# Patient Record
Sex: MALE | Race: WHITE | Employment: STUDENT | URBAN - METROPOLITAN AREA
[De-identification: names, ages, dates, MRNs, and addresses within clinical notes are randomized per-mention and may not be internally consistent; named-entity substitution may affect disease eponyms.]

---

## 2017-03-21 ENCOUNTER — OFFICE VISIT (OUTPATIENT)
Dept: FAMILY MEDICINE CLINIC | Facility: CLINIC | Age: 22
End: 2017-03-21

## 2017-03-21 VITALS
SYSTOLIC BLOOD PRESSURE: 108 MMHG | HEART RATE: 64 BPM | WEIGHT: 154 LBS | HEIGHT: 72.5 IN | TEMPERATURE: 98 F | DIASTOLIC BLOOD PRESSURE: 62 MMHG | BODY MASS INDEX: 20.63 KG/M2 | RESPIRATION RATE: 18 BRPM

## 2017-03-21 DIAGNOSIS — Z00.00 ROUTINE PHYSICAL EXAMINATION: ICD-10-CM

## 2017-03-21 DIAGNOSIS — Z13.89 SCREENING FOR GENITOURINARY CONDITION: Primary | ICD-10-CM

## 2017-03-21 DIAGNOSIS — R31.9 HEMATURIA: ICD-10-CM

## 2017-03-21 LAB
APPEARANCE: CLEAR
BILIRUB UR QL STRIP.AUTO: NEGATIVE
CLARITY UR REFRACT.AUTO: CLEAR
GLUCOSE UR STRIP.AUTO-MCNC: NEGATIVE MG/DL
KETONES UR STRIP.AUTO-MCNC: NEGATIVE MG/DL
LEUKOCYTE ESTERASE UR QL STRIP.AUTO: NEGATIVE
MULTISTIX LOT#: NORMAL NUMERIC
NITRITE UR QL STRIP.AUTO: NEGATIVE
PH UR STRIP.AUTO: 7 [PH] (ref 4.5–8)
PH, URINE: 7 (ref 4.5–8)
PROT UR STRIP.AUTO-MCNC: NEGATIVE MG/DL
RBC UR QL AUTO: NEGATIVE
SP GR UR STRIP.AUTO: 1.01 (ref 1–1.03)
SPECIFIC GRAVITY: 1.02 (ref 1–1.03)
URINE-COLOR: YELLOW
UROBILINOGEN UR STRIP.AUTO-MCNC: <2 MG/DL
UROBILINOGEN,SEMI-QN: 0.2 MG/DL (ref 0–1.9)

## 2017-03-21 PROCEDURE — 99395 PREV VISIT EST AGE 18-39: CPT | Performed by: FAMILY MEDICINE

## 2017-03-21 PROCEDURE — 81003 URINALYSIS AUTO W/O SCOPE: CPT | Performed by: FAMILY MEDICINE

## 2017-03-21 RX ORDER — EPINEPHRINE 0.3 MG/.3ML
0.3 INJECTION SUBCUTANEOUS ONCE
Qty: 1 EACH | Refills: 0 | Status: SHIPPED | OUTPATIENT
Start: 2017-03-21 | End: 2017-03-21

## 2017-03-21 NOTE — H&P
Yoel Feldman is a 22year old male with a hx of seasonal allergies, who presents for a college. Pt is not going to participate in sports. Patient complains of nothing.         Current Outpatient Prescriptions:  Cholecalciferol 2000 UNITS Oral Cap Take one ca for a college physical.  Pt is in good general health. The following issues discussed with patient: Seatbelt use, smoking avoidance, alcohol/drug avoidance, risks of drinking and driving, and sexual issues.   Last dental exam -- 1 year ago - due  Last eye

## 2017-03-22 ENCOUNTER — TELEPHONE (OUTPATIENT)
Dept: FAMILY MEDICINE CLINIC | Facility: CLINIC | Age: 22
End: 2017-03-22

## 2017-04-04 ENCOUNTER — TELEPHONE (OUTPATIENT)
Dept: FAMILY MEDICINE CLINIC | Facility: CLINIC | Age: 22
End: 2017-04-04

## 2017-04-04 RX ORDER — EPINEPHRINE 0.3 MG/.3ML
0.3 INJECTION SUBCUTANEOUS ONCE
Qty: 1 EACH | Refills: 0 | Status: CANCELLED | OUTPATIENT
Start: 2017-04-04 | End: 2017-04-04

## 2017-04-04 NOTE — TELEPHONE ENCOUNTER
Pt mother called stating she had trouble filling the EpiPen Rx that they recently received from Dr. Joaquin Gutierrez. They were looking to get the generic brand, however, the pharmacy informed them that they had to have a Rx specifically for the generic brand.  She also s

## 2018-08-08 ENCOUNTER — TELEPHONE (OUTPATIENT)
Dept: FAMILY MEDICINE CLINIC | Facility: CLINIC | Age: 23
End: 2018-08-08

## 2018-08-08 DIAGNOSIS — Z01.84 IMMUNITY STATUS TESTING: Primary | ICD-10-CM

## 2018-08-08 NOTE — TELEPHONE ENCOUNTER
Yes, he had chicken pox at age 4 or so. It should be in his paper record.  No, he doesn't \"want to\" have a TB skin test, his understanding it was optional.

## 2018-08-08 NOTE — TELEPHONE ENCOUNTER
LVM to call back. Patient has no varicella immunizations on file. Has patient had chicken pox in the past? Marisela Hatchet would like to have a varicella titer drawn to prove immunity. Would patient like to have TB testing done?      Form put in  pe

## 2018-08-10 NOTE — TELEPHONE ENCOUNTER
Mom called back. No hippa on file to give her info but she stated she knew he had chicken pox and is aware he needs a titer to check immunity. No other info was provided. Pt has not bee seen since 3/2017. Are you ok in ordering this titer?   I have pende

## 2018-08-13 ENCOUNTER — APPOINTMENT (OUTPATIENT)
Dept: LAB | Age: 23
End: 2018-08-13
Attending: FAMILY MEDICINE
Payer: COMMERCIAL

## 2018-08-13 DIAGNOSIS — Z01.84 IMMUNITY STATUS TESTING: ICD-10-CM

## 2018-08-13 PROCEDURE — 36415 COLL VENOUS BLD VENIPUNCTURE: CPT

## 2018-08-13 PROCEDURE — 86787 VARICELLA-ZOSTER ANTIBODY: CPT

## 2018-08-14 LAB — VZV IGG SER IA-ACNC: NEGATIVE

## 2018-08-15 ENCOUNTER — TELEPHONE (OUTPATIENT)
Dept: FAMILY MEDICINE CLINIC | Facility: CLINIC | Age: 23
End: 2018-08-15

## 2018-08-15 DIAGNOSIS — Z23 NEED FOR VACCINATION: Primary | ICD-10-CM

## 2019-05-03 ENCOUNTER — OFFICE VISIT (OUTPATIENT)
Dept: FAMILY MEDICINE CLINIC | Facility: CLINIC | Age: 24
End: 2019-05-03
Payer: COMMERCIAL

## 2019-05-03 VITALS
SYSTOLIC BLOOD PRESSURE: 130 MMHG | DIASTOLIC BLOOD PRESSURE: 82 MMHG | TEMPERATURE: 98 F | RESPIRATION RATE: 14 BRPM | WEIGHT: 162 LBS | HEIGHT: 72.5 IN | HEART RATE: 74 BPM | BODY MASS INDEX: 21.7 KG/M2

## 2019-05-03 DIAGNOSIS — Z71.3 ENCOUNTER FOR DIETARY COUNSELING AND SURVEILLANCE: ICD-10-CM

## 2019-05-03 DIAGNOSIS — E04.1 THYROID NODULE: ICD-10-CM

## 2019-05-03 DIAGNOSIS — Z00.00 ROUTINE GENERAL MEDICAL EXAMINATION AT A HEALTH CARE FACILITY: Primary | ICD-10-CM

## 2019-05-03 DIAGNOSIS — Z71.82 EXERCISE COUNSELING: ICD-10-CM

## 2019-05-03 DIAGNOSIS — Z00.00 LABORATORY EXAMINATION ORDERED AS PART OF A ROUTINE GENERAL MEDICAL EXAMINATION: ICD-10-CM

## 2019-05-03 DIAGNOSIS — Z00.00 EXAMINATION, ROUTINE, OVER 18 YEARS OF AGE: ICD-10-CM

## 2019-05-03 PROCEDURE — 90471 IMMUNIZATION ADMIN: CPT | Performed by: FAMILY MEDICINE

## 2019-05-03 PROCEDURE — 90716 VAR VACCINE LIVE SUBQ: CPT | Performed by: FAMILY MEDICINE

## 2019-05-03 PROCEDURE — 99395 PREV VISIT EST AGE 18-39: CPT | Performed by: FAMILY MEDICINE

## 2019-05-03 RX ORDER — EPINEPHRINE 0.3 MG/.3ML
0.3 INJECTION SUBCUTANEOUS ONCE
Qty: 1 EACH | Refills: 0 | Status: SHIPPED | OUTPATIENT
Start: 2019-05-03 | End: 2019-05-03

## 2019-05-03 NOTE — PROGRESS NOTES
Jabier Lim is a 26year old male who was brought in for his  Well Adult (physical ) visit. Subjective   History was provided by patient  HPI:   Patient presents for:  Patient presents with:   Well Adult: physical         Past Medical History  History re treatment    Development:  Current grade level:  [employer] performance/Grades: 4.0 GPA  Sports/Activities:  Beach volleyball  Safety: + seatbelt     Tobacco/Alcohol/drugs/sexual activity: Yes, drinks alcohol 1 time a month    Review of of a routine general medical examination  -     CBC WITH DIFFERENTIAL WITH PLATELET; Future  -     COMP METABOLIC PANEL (14); Future  -     TSH W REFLEX TO FREE T4; Future  -     LIPID PANEL;  Future  -     VITAMIN D, 25-HYDROXY; Future    Thyroid nodule  - reviewed. [facility] [employer] Handout provided    Follow up in 1 year    Results From Past 48 Hours:  No results found for this or any previous visit (from the past 48 hour(s)).     Orders Placed This Visit:  Orders Placed This Encounter      CBC W/DIFF

## 2019-05-28 RX ORDER — EPINEPHRINE 0.3 MG/.3ML
0.3 INJECTION SUBCUTANEOUS ONCE
Qty: 1 EACH | Refills: 0 | Status: SHIPPED | OUTPATIENT
Start: 2019-05-28 | End: 2019-05-28

## 2019-05-28 NOTE — TELEPHONE ENCOUNTER
Received notice that the epinephrine 0.3mg injection pen is on back order. Spoke with Dr Neto Gonzalez who stated that we could order Auvi-Q self injector.   Order pended to provider for approval.

## 2019-06-03 ENCOUNTER — NURSE ONLY (OUTPATIENT)
Dept: FAMILY MEDICINE CLINIC | Facility: CLINIC | Age: 24
End: 2019-06-03
Payer: COMMERCIAL

## 2019-06-03 DIAGNOSIS — R03.0 ELEVATED BLOOD PRESSURE READING IN OFFICE WITHOUT DIAGNOSIS OF HYPERTENSION: Primary | ICD-10-CM

## 2019-06-03 PROCEDURE — 90471 IMMUNIZATION ADMIN: CPT | Performed by: FAMILY MEDICINE

## 2019-06-03 PROCEDURE — 90716 VAR VACCINE LIVE SUBQ: CPT | Performed by: FAMILY MEDICINE

## 2019-06-03 NOTE — PROGRESS NOTES
Pt was seen today for a Varicella vaccine and a BP check. VIS and copy of current immunizations given to pt. BP taken 142/82, pt was asked to sit for a few minutes. Rechecked /78.   Provider informed, suggested that pt get BP cuff and check it morn

## 2019-06-03 NOTE — PROGRESS NOTES
Spoke with Dr Swathi Mcneill, she stated that there is no generic to Epi-Pen. Called to pt, reached identified bobo adames for pt with dr march and information on the [employer] site. Advised pt to cb with any other questions and ask for [facility].

## 2022-01-06 ENCOUNTER — APPOINTMENT (OUTPATIENT)
Dept: GENERAL RADIOLOGY | Age: 27
End: 2022-01-06
Attending: EMERGENCY MEDICINE

## 2022-01-06 ENCOUNTER — HOSPITAL ENCOUNTER (EMERGENCY)
Age: 27
Discharge: HOME | End: 2022-01-06
Attending: EMERGENCY MEDICINE

## 2022-01-06 VITALS
SYSTOLIC BLOOD PRESSURE: 129 MMHG | RESPIRATION RATE: 18 BRPM | WEIGHT: 150 LBS | DIASTOLIC BLOOD PRESSURE: 85 MMHG | HEART RATE: 73 BPM | TEMPERATURE: 97.9 F | OXYGEN SATURATION: 98 %

## 2022-01-06 DIAGNOSIS — R07.9 CHEST PAIN, UNSPECIFIED TYPE: Primary | ICD-10-CM

## 2022-01-06 LAB
ALBUMIN SERPL-MCNC: 4.2 G/DL (ref 3.6–5.1)
ALBUMIN/GLOB SERPL: 1.3 {RATIO} (ref 1–2.4)
ALP SERPL-CCNC: 54 UNITS/L (ref 45–117)
ALT SERPL-CCNC: 25 UNITS/L
ANION GAP SERPL CALC-SCNC: 14 MMOL/L (ref 10–20)
APTT PPP: 29 SEC (ref 22–30)
AST SERPL-CCNC: 19 UNITS/L
BASOPHILS # BLD: 0 K/MCL (ref 0–0.3)
BASOPHILS NFR BLD: 0 %
BILIRUB SERPL-MCNC: 1.3 MG/DL (ref 0.2–1)
BUN SERPL-MCNC: 18 MG/DL (ref 6–20)
BUN/CREAT SERPL: 19 (ref 7–25)
CALCIUM SERPL-MCNC: 8.9 MG/DL (ref 8.4–10.2)
CHLORIDE SERPL-SCNC: 104 MMOL/L (ref 98–107)
CO2 SERPL-SCNC: 31 MMOL/L (ref 21–32)
CREAT SERPL-MCNC: 0.94 MG/DL (ref 0.67–1.17)
D DIMER PPP FEU-MCNC: <0.19 MG/L (FEU)
DEPRECATED RDW RBC: 38.5 FL (ref 39–50)
EOSINOPHIL # BLD: 0 K/MCL (ref 0–0.5)
EOSINOPHIL NFR BLD: 0 %
ERYTHROCYTE [DISTWIDTH] IN BLOOD: 11.7 % (ref 11–15)
FASTING DURATION TIME PATIENT: ABNORMAL H
GFR SERPLBLD BASED ON 1.73 SQ M-ARVRAT: >90 ML/MIN
GLOBULIN SER-MCNC: 3.2 G/DL (ref 2–4)
GLUCOSE SERPL-MCNC: 93 MG/DL (ref 70–99)
HCT VFR BLD CALC: 44.1 % (ref 39–51)
HGB BLD-MCNC: 15.2 G/DL (ref 13–17)
IMM GRANULOCYTES # BLD AUTO: 0 K/MCL (ref 0–0.2)
IMM GRANULOCYTES # BLD: 0 %
INR PPP: 1.1
LYMPHOCYTES # BLD: 1.5 K/MCL (ref 1–4.8)
LYMPHOCYTES NFR BLD: 31 %
MAGNESIUM SERPL-MCNC: 2.2 MG/DL (ref 1.7–2.4)
MCH RBC QN AUTO: 31.1 PG (ref 26–34)
MCHC RBC AUTO-ENTMCNC: 34.5 G/DL (ref 32–36.5)
MCV RBC AUTO: 90.4 FL (ref 78–100)
MONOCYTES # BLD: 0.5 K/MCL (ref 0.3–0.9)
MONOCYTES NFR BLD: 10 %
NEUTROPHILS # BLD: 2.9 K/MCL (ref 1.8–7.7)
NEUTROPHILS NFR BLD: 59 %
NRBC BLD MANUAL-RTO: 0 /100 WBC
NT-PROBNP SERPL-MCNC: 40 PG/ML
PLATELET # BLD AUTO: 207 K/MCL (ref 140–450)
POTASSIUM SERPL-SCNC: 3.8 MMOL/L (ref 3.4–5.1)
PROT SERPL-MCNC: 7.4 G/DL (ref 6.4–8.2)
PROTHROMBIN TIME: 11.7 SEC (ref 9.7–11.8)
RBC # BLD: 4.88 MIL/MCL (ref 4.5–5.9)
SODIUM SERPL-SCNC: 145 MMOL/L (ref 135–145)
TROPONIN I SERPL DL<=0.01 NG/ML-MCNC: 21 NG/L
WBC # BLD: 5 K/MCL (ref 4.2–11)

## 2022-01-06 PROCEDURE — 80053 COMPREHEN METABOLIC PANEL: CPT | Performed by: EMERGENCY MEDICINE

## 2022-01-06 PROCEDURE — 36415 COLL VENOUS BLD VENIPUNCTURE: CPT

## 2022-01-06 PROCEDURE — 85730 THROMBOPLASTIN TIME PARTIAL: CPT | Performed by: EMERGENCY MEDICINE

## 2022-01-06 PROCEDURE — 85379 FIBRIN DEGRADATION QUANT: CPT | Performed by: EMERGENCY MEDICINE

## 2022-01-06 PROCEDURE — 85610 PROTHROMBIN TIME: CPT | Performed by: EMERGENCY MEDICINE

## 2022-01-06 PROCEDURE — 71045 X-RAY EXAM CHEST 1 VIEW: CPT

## 2022-01-06 PROCEDURE — 83880 ASSAY OF NATRIURETIC PEPTIDE: CPT | Performed by: EMERGENCY MEDICINE

## 2022-01-06 PROCEDURE — 93005 ELECTROCARDIOGRAM TRACING: CPT | Performed by: EMERGENCY MEDICINE

## 2022-01-06 PROCEDURE — 10004651 HB RX, NO CHARGE ITEM: Performed by: EMERGENCY MEDICINE

## 2022-01-06 PROCEDURE — 99285 EMERGENCY DEPT VISIT HI MDM: CPT

## 2022-01-06 PROCEDURE — 84484 ASSAY OF TROPONIN QUANT: CPT | Performed by: EMERGENCY MEDICINE

## 2022-01-06 PROCEDURE — 83735 ASSAY OF MAGNESIUM: CPT | Performed by: EMERGENCY MEDICINE

## 2022-01-06 PROCEDURE — 85025 COMPLETE CBC W/AUTO DIFF WBC: CPT | Performed by: EMERGENCY MEDICINE

## 2022-01-06 RX ORDER — 0.9 % SODIUM CHLORIDE 0.9 %
2 VIAL (ML) INJECTION EVERY 12 HOURS SCHEDULED
Status: DISCONTINUED | OUTPATIENT
Start: 2022-01-06 | End: 2022-01-06

## 2022-01-06 RX ORDER — ASPIRIN 81 MG/1
324 TABLET, CHEWABLE ORAL ONCE
Status: COMPLETED | OUTPATIENT
Start: 2022-01-06 | End: 2022-01-06

## 2022-01-06 RX ADMIN — ASPIRIN 81 MG CHEWABLE TABLET 324 MG: 81 TABLET CHEWABLE at 21:41

## 2022-01-06 ASSESSMENT — HEART SCORE
HEART SCORE: 0
AGE: LESS THAN OR EQUAL TO 45
EKG: NORMAL
HISTORY: SLIGHTLY SUSPICIOUS
RISK FACTORS: NO RISK FACTORS KNOWN
TROPONIN: EQUAL OR LESS THAN NORMAL LIMIT

## 2022-01-06 ASSESSMENT — PAIN DESCRIPTION - PAIN TYPE: TYPE: ACUTE PAIN

## 2022-01-06 ASSESSMENT — PAIN SCALES - GENERAL: PAINLEVEL_OUTOF10: 3

## 2022-01-11 ENCOUNTER — OFFICE VISIT (OUTPATIENT)
Dept: FAMILY MEDICINE CLINIC | Facility: CLINIC | Age: 27
End: 2022-01-11
Payer: COMMERCIAL

## 2022-01-11 VITALS
RESPIRATION RATE: 16 BRPM | TEMPERATURE: 98 F | DIASTOLIC BLOOD PRESSURE: 70 MMHG | SYSTOLIC BLOOD PRESSURE: 120 MMHG | OXYGEN SATURATION: 100 % | BODY MASS INDEX: 20.32 KG/M2 | WEIGHT: 150 LBS | HEART RATE: 82 BPM | HEIGHT: 72 IN

## 2022-01-11 DIAGNOSIS — Z09 HOSPITAL DISCHARGE FOLLOW-UP: ICD-10-CM

## 2022-01-11 DIAGNOSIS — R07.89 OTHER CHEST PAIN: Primary | ICD-10-CM

## 2022-01-11 DIAGNOSIS — E55.9 VITAMIN D DEFICIENCY: ICD-10-CM

## 2022-01-11 DIAGNOSIS — T14.8XXA MUSCULOSKELETAL STRAIN: ICD-10-CM

## 2022-01-11 DIAGNOSIS — Z00.00 LABORATORY EXAMINATION ORDERED AS PART OF A ROUTINE GENERAL MEDICAL EXAMINATION: ICD-10-CM

## 2022-01-11 PROBLEM — M25.50 PAIN IN JOINT: Status: ACTIVE | Noted: 2022-01-04

## 2022-01-11 PROBLEM — I73.00 RAYNAUD'S PHENOMENON WITHOUT GANGRENE: Status: ACTIVE | Noted: 2022-01-04

## 2022-01-11 LAB
ATRIAL RATE (BPM): 102
P AXIS (DEGREES): 51
PR-INTERVAL (MSEC): 112
QRS-INTERVAL (MSEC): 112
QT-INTERVAL (MSEC): 336
QTC: 440
R AXIS (DEGREES): 80
REPORT TEXT: NORMAL
T AXIS (DEGREES): 59
VENTRICULAR RATE EKG/MIN (BPM): 103

## 2022-01-11 PROCEDURE — 3078F DIAST BP <80 MM HG: CPT | Performed by: FAMILY MEDICINE

## 2022-01-11 PROCEDURE — 3074F SYST BP LT 130 MM HG: CPT | Performed by: FAMILY MEDICINE

## 2022-01-11 PROCEDURE — 99215 OFFICE O/P EST HI 40 MIN: CPT | Performed by: FAMILY MEDICINE

## 2022-01-11 PROCEDURE — 3008F BODY MASS INDEX DOCD: CPT | Performed by: FAMILY MEDICINE

## 2022-01-11 RX ORDER — WITCH HAZEL 50 %
PADS, MEDICATED (EA) TOPICAL
COMMUNITY

## 2022-01-11 NOTE — PROGRESS NOTES
Refugio Jonas is a 23year old male. HPI:   Pt. Is here for follow up. Pt. Noted  Pain in right lower side since November. Notes it is uncomfortable. Tighter pants, makes it hurt. Wonders if it veins are popping more in that area.   Notes in flank and to SYSTEMS:   GENERAL: feels well otherwise  SKIN: denies any unusual skin lesions  LUNGS: denies shortness of breath with exertion  CARDIOVASCULAR: denies chest pain on exertion  GI: ? abdominal pain,denies heartburn  MUSCULOSKELETAL: denies back pain; flank in about 3 months (around 4/11/2022) for physical, BP check.

## 2022-01-17 ENCOUNTER — TELEPHONE (OUTPATIENT)
Dept: SCHEDULING | Age: 27
End: 2022-01-17

## 2022-01-19 LAB
ALBUMIN/GLOBULIN RATIO: 1.8 (CALC) (ref 1–2.5)
ALBUMIN: 4.7 G/DL (ref 3.6–5.1)
ALKALINE PHOSPHATASE: 48 U/L (ref 36–130)
ALT: 12 U/L (ref 9–46)
AST: 16 U/L (ref 10–40)
BILIRUBIN, TOTAL: 1.5 MG/DL (ref 0.2–1.2)
BUN: 15 MG/DL (ref 7–25)
CALCIUM: 9.8 MG/DL (ref 8.6–10.3)
CARBON DIOXIDE: 28 MMOL/L (ref 20–32)
CHLORIDE: 104 MMOL/L (ref 98–110)
CHOL/HDLC RATIO: 2.6 (CALC)
CHOLESTEROL, TOTAL: 165 MG/DL
CREATININE: 1.1 MG/DL (ref 0.6–1.35)
EGFR IF AFRICN AM: 108 ML/MIN/1.73M2
EGFR IF NONAFRICN AM: 93 ML/MIN/1.73M2
GLOBULIN: 2.6 G/DL (CALC) (ref 1.9–3.7)
GLUCOSE: 87 MG/DL (ref 65–99)
HDL CHOLESTEROL: 64 MG/DL
LDL-CHOLESTEROL: 82 MG/DL (CALC)
NON-HDL CHOLESTEROL: 101 MG/DL (CALC)
POTASSIUM: 4.1 MMOL/L (ref 3.5–5.3)
PROTEIN, TOTAL: 7.3 G/DL (ref 6.1–8.1)
SODIUM: 140 MMOL/L (ref 135–146)
TRIGLYCERIDES: 93 MG/DL
TSH W/REFLEX TO FT4: 1.44 MIU/L (ref 0.4–4.5)
VITAMIN D, 25-OH, TOTAL: 35 NG/ML (ref 30–100)

## 2022-03-01 ENCOUNTER — TELEPHONE (OUTPATIENT)
Dept: FAMILY MEDICINE CLINIC | Facility: CLINIC | Age: 27
End: 2022-03-01

## 2022-03-01 NOTE — TELEPHONE ENCOUNTER
Patient is calling to say that he is getting those shaking episodes again and would like to see what the next step is to see what is going on.

## 2022-03-02 LAB
EBV NUCLEAR ANTIGEN AB, IGG: >600
EPSTEIN BARR VIRUS VCA$ANTIBODY (IGG): 229
EPSTEIN BARR VIRUS VCA$ANTIBODY (IGM): <36
Lab: <0.9

## 2022-03-02 NOTE — TELEPHONE ENCOUNTER
Patient called back and states that he would prefer to see Dr. Sanjuana Phoenix or another provider in our office first r/t some \"GI issues\". He reports onset: 2 months ago    Certain foods he eats will make him feel some GI upset: discomfort at lower right back area at times, sense of urgency of needing to have BM without producing one. Monday had last tremors/shaking episode after fasting before for 24 hours, drinking H2O, apple/orange juice (about 8/16 oz) and some fruit-->did not have any large meals. When he did eat his first full meal the shaking became more visible, denies LOC--> his wife gave him some food (toast with jam/peanut butter, H2O, nuts); After getting under blanket then it subsided. He felt some coldness in his extremities (Raynaud's Syndrome [Dx:1/422] symptoms). BM irregular-->last BM today and has one q-day; He states that he has been keeping a log as he noticed a common thread associated with shakiness--> he states that BMs have been semi-formed and notices that there has not been adequate digestion of food (can see chunks of food). He is aware that the above will be conveyed to Dr. Sanjuana Phoenix and he will receive a call back with recommendation. Patient verbalized understanding. No further questions or concerns at this time. Dr. Sanjuana Phoenix to advise appointment today/tomorrow --Patient is not opposed to seeing Dr. London Hampton for acute visit tomorrow.

## 2022-03-02 NOTE — TELEPHONE ENCOUNTER
Referral placed for Neuro:  Rupa Pickett MD  [id number] [facility] [phone]    [CPE scheduled to see Dr. Jaspreet Prince on 4/11/22]    Grace

## 2022-03-02 NOTE — TELEPHONE ENCOUNTER
Per Dr. Bethany Cox patient to come in tomorrow to be seen at 12:30 pm.    I called patient and made him aware of above-->scheduled. Patient verbalized understanding. No further questions or concerns at this time.

## 2022-03-03 ENCOUNTER — OFFICE VISIT (OUTPATIENT)
Dept: FAMILY MEDICINE CLINIC | Facility: CLINIC | Age: 27
End: 2022-03-03
Payer: COMMERCIAL

## 2022-03-03 VITALS
BODY MASS INDEX: 20.32 KG/M2 | TEMPERATURE: 99 F | RESPIRATION RATE: 18 BRPM | SYSTOLIC BLOOD PRESSURE: 118 MMHG | OXYGEN SATURATION: 100 % | HEART RATE: 93 BPM | DIASTOLIC BLOOD PRESSURE: 70 MMHG | WEIGHT: 150 LBS | HEIGHT: 72 IN

## 2022-03-03 DIAGNOSIS — M99.08 RIB CAGE DYSFUNCTION: ICD-10-CM

## 2022-03-03 DIAGNOSIS — T14.8XXA MUSCULOSKELETAL STRAIN: ICD-10-CM

## 2022-03-03 DIAGNOSIS — R10.9 STOMACH PAIN: ICD-10-CM

## 2022-03-03 DIAGNOSIS — R94.31 ABNORMAL EKG: ICD-10-CM

## 2022-03-03 DIAGNOSIS — R19.4 RECENT CHANGE IN FREQUENCY OF BOWEL MOVEMENTS: ICD-10-CM

## 2022-03-03 DIAGNOSIS — R25.1 SHAKING: ICD-10-CM

## 2022-03-03 DIAGNOSIS — I45.10 INCOMPLETE RBBB: ICD-10-CM

## 2022-03-03 DIAGNOSIS — R07.89 OTHER CHEST PAIN: Primary | ICD-10-CM

## 2022-03-03 DIAGNOSIS — M99.08 SOMATIC DYSFUNCTION OF RIB: ICD-10-CM

## 2022-03-03 DIAGNOSIS — I73.00 RAYNAUD'S PHENOMENON WITHOUT GANGRENE: ICD-10-CM

## 2022-03-03 PROCEDURE — 3008F BODY MASS INDEX DOCD: CPT | Performed by: FAMILY MEDICINE

## 2022-03-03 PROCEDURE — 99215 OFFICE O/P EST HI 40 MIN: CPT | Performed by: FAMILY MEDICINE

## 2022-03-03 PROCEDURE — 93000 ELECTROCARDIOGRAM COMPLETE: CPT | Performed by: FAMILY MEDICINE

## 2022-03-03 PROCEDURE — 3078F DIAST BP <80 MM HG: CPT | Performed by: FAMILY MEDICINE

## 2022-03-03 PROCEDURE — 3074F SYST BP LT 130 MM HG: CPT | Performed by: FAMILY MEDICINE

## 2022-03-03 RX ORDER — OMEPRAZOLE 40 MG/1
40 CAPSULE, DELAYED RELEASE ORAL DAILY
Qty: 30 CAPSULE | Refills: 3 | Status: SHIPPED | OUTPATIENT
Start: 2022-03-03 | End: 2023-02-26

## 2022-03-03 NOTE — PROGRESS NOTES
Dear Stan Burns,    It shows that you have had a history of mononucleosis but no active disease.     Sincerely,  Dr. Geraldine Wiseman

## 2022-03-17 ENCOUNTER — TELEPHONE (OUTPATIENT)
Dept: FAMILY MEDICINE CLINIC | Facility: CLINIC | Age: 27
End: 2022-03-17

## 2022-03-17 DIAGNOSIS — R19.4 RECENT CHANGE IN FREQUENCY OF BOWEL MOVEMENTS: ICD-10-CM

## 2022-03-17 DIAGNOSIS — R10.9 STOMACH PAIN: Primary | ICD-10-CM

## 2022-03-17 NOTE — TELEPHONE ENCOUNTER
Referral ordered for GI:  Sheryle Newport, [address] [facility],[address]  [phone]    I called patient and made him aware that referral was placed as requested--->LMTCB and MyChart message sent-->see message.

## 2022-03-17 NOTE — TELEPHONE ENCOUNTER
Patient states that he had a GI flare that presented umbilicus and right sided pain with fever, went to ED and they confirmed that no appendicitis, they suggested GI Consult Tuesday 3/15/2022 went to [facility]. He scheduled appointment with GI, Tano Cunningham on 4/5/2022 at 2:40 for further evaluation    Now he is doing better, improving. ONSET: 3/15/2022 increase of s/s that prompted ED visit. # of episodes of diarrhea daily: on Sunday    Last BM: today (went he went to ED they suggested stool softener) has incorprated OTC stool softener, last taken yesterday    Color/consistency: brown/well formed and softer than normal    Blood in stool: denies     Fever: Denies, subsided yesterday mid-day, prior temp 100.0F    Nausea/Vomiting: Denies    Abdominal discomfort/pain: currently--> 2-3/10 today; Tuesday Wednesday present rated at 5-6/10. Abdominal distention: denies now, couple of days ago present-->passing gas today    Hydration status (amount of fluid intake-->water/electrolytes, mouth moist, urinating normally): drinking about 2L of H2O a day and some some herbal tea peach/dotty. Mouth is moist, urinating normal.    Fatigue: Tues/Wed-->moderate; today-->mild    Appetite/Diet: improved since Tuesday/Wednesday. Diet consists of: smoothie-->green smoothie with banana kale. BRAT diet not implemented. Tea (peach dotty), toast, butternut squash. Supportive measures/meds:  Omeprazole prescribed, as directed-->he is unsure if Rx has helped thus far, he will continue to take.      I called [facility] to request ED visit records--> rep indicated fax request to [phone]-->[facility] request for ED visit notes, labs, diagnotic imaging from 3/15/2022 ED visit  [Await records requested]    Patient would like a referral placed for GI/ Dr. Penni Helms Dubin--> pended referral for Dr. Marietta Nixon to review and sign (at her discretion)    Dr. Marietta Nixon to review and advise

## 2022-04-19 RX ORDER — EPINEPHRINE 0.3 MG/.3ML
0.3 INJECTION SUBCUTANEOUS ONCE
Qty: 1 EACH | Refills: 1 | Status: SHIPPED | OUTPATIENT
Start: 2022-04-19 | End: 2022-04-19

## 2022-04-19 NOTE — TELEPHONE ENCOUNTER
Arsh Teran is calling to get a new epi pen sent to his  Home	[place], so he is ready for the upcoming summer.  Questions or concerns please call Arsh Teran at [phone]

## 2022-07-12 ENCOUNTER — OFFICE VISIT (OUTPATIENT)
Dept: FAMILY MEDICINE CLINIC | Facility: CLINIC | Age: 27
End: 2022-07-12
Payer: COMMERCIAL

## 2022-07-12 VITALS
WEIGHT: 150 LBS | DIASTOLIC BLOOD PRESSURE: 60 MMHG | HEIGHT: 72 IN | RESPIRATION RATE: 16 BRPM | OXYGEN SATURATION: 100 % | HEART RATE: 82 BPM | SYSTOLIC BLOOD PRESSURE: 110 MMHG | TEMPERATURE: 98 F | BODY MASS INDEX: 20.32 KG/M2

## 2022-07-12 DIAGNOSIS — N41.0 ACUTE PROSTATITIS: ICD-10-CM

## 2022-07-12 DIAGNOSIS — R30.0 DYSURIA: Primary | ICD-10-CM

## 2022-07-12 LAB
APPEARANCE: CLEAR
BILIRUBIN: NEGATIVE
GLUCOSE (URINE DIPSTICK): NEGATIVE MG/DL
KETONES (URINE DIPSTICK): NEGATIVE MG/DL
LEUKOCYTES: NEGATIVE
MULTISTIX LOT#: ABNORMAL NUMERIC
NITRITE, URINE: NEGATIVE
OCCULT BLOOD: NEGATIVE
PH, URINE: 8.5 (ref 4.5–8)
PROTEIN (URINE DIPSTICK): NEGATIVE MG/DL
SPECIFIC GRAVITY: 1.01 (ref 1–1.03)
URINE-COLOR: YELLOW
UROBILINOGEN,SEMI-QN: 0.2 MG/DL (ref 0–1.9)

## 2022-07-12 PROCEDURE — 81003 URINALYSIS AUTO W/O SCOPE: CPT | Performed by: STUDENT IN AN ORGANIZED HEALTH CARE EDUCATION/TRAINING PROGRAM

## 2022-07-12 PROCEDURE — 3074F SYST BP LT 130 MM HG: CPT | Performed by: STUDENT IN AN ORGANIZED HEALTH CARE EDUCATION/TRAINING PROGRAM

## 2022-07-12 PROCEDURE — 99213 OFFICE O/P EST LOW 20 MIN: CPT | Performed by: STUDENT IN AN ORGANIZED HEALTH CARE EDUCATION/TRAINING PROGRAM

## 2022-07-12 PROCEDURE — 87086 URINE CULTURE/COLONY COUNT: CPT | Performed by: STUDENT IN AN ORGANIZED HEALTH CARE EDUCATION/TRAINING PROGRAM

## 2022-07-12 PROCEDURE — 3008F BODY MASS INDEX DOCD: CPT | Performed by: STUDENT IN AN ORGANIZED HEALTH CARE EDUCATION/TRAINING PROGRAM

## 2022-07-12 PROCEDURE — 3078F DIAST BP <80 MM HG: CPT | Performed by: STUDENT IN AN ORGANIZED HEALTH CARE EDUCATION/TRAINING PROGRAM

## 2022-07-12 RX ORDER — EPINEPHRINE 0.3 MG/.3ML
INJECTION SUBCUTANEOUS
COMMUNITY
Start: 2022-04-19

## 2022-07-15 ENCOUNTER — TELEPHONE (OUTPATIENT)
Dept: FAMILY MEDICINE CLINIC | Facility: CLINIC | Age: 27
End: 2022-07-15

## 2022-07-15 DIAGNOSIS — N41.0 ACUTE BACTERIAL PROSTATITIS: Primary | ICD-10-CM

## 2022-07-15 RX ORDER — SULFAMETHOXAZOLE AND TRIMETHOPRIM 800; 160 MG/1; MG/1
1 TABLET ORAL 2 TIMES DAILY
Qty: 84 TABLET | Refills: 0 | Status: SHIPPED | OUTPATIENT
Start: 2022-07-15 | End: 2022-08-26

## 2022-07-15 NOTE — TELEPHONE ENCOUNTER
Delroy Coreas is returning a my chart message from Dr Chelsey Arguello, he is wondering if the antibiotic was called in for him, please call Delroy Coreas at [phone]

## 2022-07-15 NOTE — TELEPHONE ENCOUNTER
Called and informed patient of negative urine culture result. Informed that it is still possible he has prostatitis, especially based on history and exam. Recommend starting Bactrim DS BID for at least 4 weeks, if not feeling completely back to normal by then can extend by 2 additional weeks. Take antibiotic with food and also take probitoic to reduce GI side effects. Also will place urology referral if not improving after 1st week on antibiotics. Patient endorses understanding and has no further questions at this time.      Neema Baldwin MD, 07/15/22, 12:16 PM

## 2022-08-17 ENCOUNTER — APPOINTMENT (OUTPATIENT)
Dept: ULTRASOUND IMAGING | Facility: HOSPITAL | Age: 27
End: 2022-08-17
Attending: INTERNAL MEDICINE
Payer: COMMERCIAL

## 2022-08-17 ENCOUNTER — APPOINTMENT (OUTPATIENT)
Dept: CT IMAGING | Facility: HOSPITAL | Age: 27
End: 2022-08-17
Attending: EMERGENCY MEDICINE
Payer: COMMERCIAL

## 2022-08-17 ENCOUNTER — HOSPITAL ENCOUNTER (INPATIENT)
Facility: HOSPITAL | Age: 27
LOS: 3 days | Discharge: HOME | End: 2022-08-20
Attending: EMERGENCY MEDICINE | Admitting: INTERNAL MEDICINE
Payer: COMMERCIAL

## 2022-08-17 ENCOUNTER — TELEPHONE (OUTPATIENT)
Dept: FAMILY MEDICINE CLINIC | Facility: CLINIC | Age: 27
End: 2022-08-17

## 2022-08-17 DIAGNOSIS — R79.89 LFTS ABNORMAL: ICD-10-CM

## 2022-08-17 DIAGNOSIS — E80.6 HYPERBILIRUBINEMIA: Primary | ICD-10-CM

## 2022-08-17 LAB
ALBUMIN SERPL-MCNC: 4.1 G/DL (ref 3.4–5)
ALBUMIN/GLOB SERPL: 1.1 {RATIO} (ref 1–2)
ALP LIVER SERPL-CCNC: 214 U/L
ALT SERPL-CCNC: 219 U/L
ANION GAP SERPL CALC-SCNC: 6 MMOL/L (ref 0–18)
APAP SERPL-MCNC: <2 UG/ML (ref 10–30)
AST SERPL-CCNC: 86 U/L (ref 15–37)
BASOPHILS # BLD AUTO: 0.05 X10(3) UL (ref 0–0.2)
BASOPHILS NFR BLD AUTO: 1.2 %
BILIRUB DIRECT SERPL-MCNC: 6.6 MG/DL (ref 0–0.2)
BILIRUB SERPL-MCNC: 9.3 MG/DL (ref 0.1–2)
BILIRUB UR QL CFM: POSITIVE
BUN BLD-MCNC: 10 MG/DL (ref 7–18)
CALCIUM BLD-MCNC: 9.3 MG/DL (ref 8.5–10.1)
CERULOPLASMIN SERPL-MCNC: 30 MG/DL (ref 20–60)
CHLORIDE SERPL-SCNC: 101 MMOL/L (ref 98–112)
CLARITY UR REFRACT.AUTO: CLEAR
CO2 SERPL-SCNC: 26 MMOL/L (ref 21–32)
CREAT BLD-MCNC: 1.22 MG/DL
DEPRECATED HBV CORE AB SER IA-ACNC: 374.7 NG/ML
EOSINOPHIL # BLD AUTO: 0.06 X10(3) UL (ref 0–0.7)
EOSINOPHIL NFR BLD AUTO: 1.4 %
ERYTHROCYTE [DISTWIDTH] IN BLOOD BY AUTOMATED COUNT: 11.8 %
GFR SERPLBLD BASED ON 1.73 SQ M-ARVRAT: 84 ML/MIN/1.73M2 (ref 60–?)
GLOBULIN PLAS-MCNC: 3.7 G/DL (ref 2.8–4.4)
GLUCOSE BLD-MCNC: 96 MG/DL (ref 70–99)
HAV IGM SER QL: NONREACTIVE
HBV CORE IGM SER QL: NONREACTIVE
HBV SURFACE AG SERPL QL IA: NONREACTIVE
HCT VFR BLD AUTO: 45.2 %
HCV AB SERPL QL IA: NONREACTIVE
HGB BLD-MCNC: 15.7 G/DL
IMM GRANULOCYTES # BLD AUTO: 0.01 X10(3) UL (ref 0–1)
IMM GRANULOCYTES NFR BLD: 0.2 %
IMMUNOGLOBULIN PNL SER-MCNC: 1010 MG/DL (ref 791–1643)
INR BLD: 0.92 (ref 0.85–1.16)
LIPASE SERPL-CCNC: 221 U/L (ref 73–393)
LYMPHOCYTES # BLD AUTO: 0.75 X10(3) UL (ref 1–4)
LYMPHOCYTES NFR BLD AUTO: 18.1 %
MCH RBC QN AUTO: 31.6 PG (ref 26–34)
MCHC RBC AUTO-ENTMCNC: 34.7 G/DL (ref 31–37)
MCV RBC AUTO: 90.9 FL
MONOCYTES # BLD AUTO: 0.43 X10(3) UL (ref 0.1–1)
MONOCYTES NFR BLD AUTO: 10.4 %
NEUTROPHILS # BLD AUTO: 2.84 X10 (3) UL (ref 1.5–7.7)
NEUTROPHILS # BLD AUTO: 2.84 X10(3) UL (ref 1.5–7.7)
NEUTROPHILS NFR BLD AUTO: 68.7 %
OSMOLALITY SERPL CALC.SUM OF ELEC: 275 MOSM/KG (ref 275–295)
PLATELET # BLD AUTO: 240 10(3)UL (ref 150–450)
POTASSIUM SERPL-SCNC: 3.9 MMOL/L (ref 3.5–5.1)
PROT SERPL-MCNC: 7.8 G/DL (ref 6.4–8.2)
PROTHROMBIN TIME: 12.4 SECONDS (ref 11.6–14.8)
RBC # BLD AUTO: 4.97 X10(6)UL
SARS-COV-2 RNA RESP QL NAA+PROBE: NOT DETECTED
SODIUM SERPL-SCNC: 133 MMOL/L (ref 136–145)
SP GR UR REFRACTOMETRY: 1.02 (ref 1–1.03)
WBC # BLD AUTO: 4.1 X10(3) UL (ref 4–11)

## 2022-08-17 PROCEDURE — 74177 CT ABD & PELVIS W/CONTRAST: CPT | Performed by: EMERGENCY MEDICINE

## 2022-08-17 PROCEDURE — 76700 US EXAM ABDOM COMPLETE: CPT | Performed by: INTERNAL MEDICINE

## 2022-08-17 PROCEDURE — 99223 1ST HOSP IP/OBS HIGH 75: CPT | Performed by: HOSPITALIST

## 2022-08-17 RX ORDER — BISACODYL 10 MG
10 SUPPOSITORY, RECTAL RECTAL
Status: DISCONTINUED | OUTPATIENT
Start: 2022-08-17 | End: 2022-08-20

## 2022-08-17 RX ORDER — ACETAMINOPHEN 500 MG
1000 TABLET ORAL EVERY 4 HOURS PRN
Status: DISCONTINUED | OUTPATIENT
Start: 2022-08-17 | End: 2022-08-18

## 2022-08-17 RX ORDER — PROCHLORPERAZINE EDISYLATE 5 MG/ML
5 INJECTION INTRAMUSCULAR; INTRAVENOUS EVERY 8 HOURS PRN
Status: DISCONTINUED | OUTPATIENT
Start: 2022-08-17 | End: 2022-08-20

## 2022-08-17 RX ORDER — MELATONIN
3 NIGHTLY PRN
Status: DISCONTINUED | OUTPATIENT
Start: 2022-08-17 | End: 2022-08-20

## 2022-08-17 RX ORDER — LEVOFLOXACIN 5 MG/ML
750 INJECTION, SOLUTION INTRAVENOUS EVERY 24 HOURS
Status: DISCONTINUED | OUTPATIENT
Start: 2022-08-17 | End: 2022-08-18

## 2022-08-17 RX ORDER — IOHEXOL 350 MG/ML
80 INJECTION, SOLUTION INTRAVENOUS
Status: COMPLETED | OUTPATIENT
Start: 2022-08-17 | End: 2022-08-17

## 2022-08-17 RX ORDER — ECHINACEA PURPUREA EXTRACT 125 MG
1 TABLET ORAL
Status: DISCONTINUED | OUTPATIENT
Start: 2022-08-17 | End: 2022-08-20

## 2022-08-17 RX ORDER — ONDANSETRON 2 MG/ML
4 INJECTION INTRAMUSCULAR; INTRAVENOUS EVERY 6 HOURS PRN
Status: DISCONTINUED | OUTPATIENT
Start: 2022-08-17 | End: 2022-08-20

## 2022-08-17 RX ORDER — SODIUM PHOSPHATE, DIBASIC AND SODIUM PHOSPHATE, MONOBASIC 7; 19 G/133ML; G/133ML
1 ENEMA RECTAL ONCE AS NEEDED
Status: DISCONTINUED | OUTPATIENT
Start: 2022-08-17 | End: 2022-08-20

## 2022-08-17 RX ORDER — POLYETHYLENE GLYCOL 3350 17 G/17G
17 POWDER, FOR SOLUTION ORAL DAILY PRN
Status: DISCONTINUED | OUTPATIENT
Start: 2022-08-17 | End: 2022-08-20

## 2022-08-17 RX ORDER — SODIUM CHLORIDE 9 MG/ML
INJECTION, SOLUTION INTRAVENOUS CONTINUOUS
Status: DISCONTINUED | OUTPATIENT
Start: 2022-08-17 | End: 2022-08-20

## 2022-08-17 RX ORDER — SENNOSIDES 8.6 MG
17.2 TABLET ORAL NIGHTLY PRN
Status: DISCONTINUED | OUTPATIENT
Start: 2022-08-17 | End: 2022-08-20

## 2022-08-17 NOTE — TELEPHONE ENCOUNTER
Has been on Bactrim x 1 month for prostatitis. He had been improving and they did go on vacation (forgot to ask where). For the past week he has been having really \"yellow\" urine, eyes and skin have a yellow tint, pain in upper abdomen and nausea. Also 100 F.fever. Patient currently in [facility]. But he has O insurance, instructed to got to ER/urgent care for evaluation and treatment. Asked that they share information with us for continuity of care. Patient would like to speak to you at some point today if that is ok with you.

## 2022-08-17 NOTE — TELEPHONE ENCOUNTER
Called and spoke with patient. While possible that some of his symptoms (nausea and abdominal upset) could be side effect of Bactrim use, patient's symptoms of yellowing skin/eyes and fever is concerning for liver dysfunction. Would recommend evaluation at closes immediate care (as patient lives an hour away from clinic) for liver function and potential etiologies including hepatitis. Otherwise recommend stopping Bactrim for now. Patient has f/u with urologist on Monday (8/22) with urologist, can decide with urologist whether to go back on Bactrim. Patient otherwise endorses that his prostatitis symptoms have significantly imprpoved. Patient endorses understanding of plan and has no further questions at this time.      Willard Dumont MD, 08/17/22, 11:00 AM

## 2022-08-17 NOTE — ED INITIAL ASSESSMENT (HPI)
Pt states that one month ago he started having groin pain and his dr dx him with prostatitis and was dx bactrim. Pt has been taking this for the past 4 weeks. In the last week pt noted darker urine, abdominal pain, constipation, light colored stools, and jaundice. Pt was seen at urgent care today who was concerned with liver failure. No blood work done at immediate care.

## 2022-08-17 NOTE — TELEPHONE ENCOUNTER
Pt had an OV on 7/12/22. He has been on abx since then & has another two week remaining. Pt is experiencing side effects and would like to discuss with a nurse.     Please advise and thank you

## 2022-08-17 NOTE — ED QUICK NOTES
Orders for admission, patient is aware of plan and ready to go upstairs. Any questions, please call ED RN alicia at extension [phone]. Patient Covid vaccination status: Unvaccinated     COVID Test Ordered in ED: Rapid SARS-CoV-2 by PCR    COVID Suspicion at Admission: Low clinical suspicion for COVID    Running Infusions:  None    Mental Status/LOC at time of transport: AxO x4    Other pertinent information: Wife at bedside.    CIWA score: N/A   NIH score:  N/A

## 2022-08-18 ENCOUNTER — APPOINTMENT (OUTPATIENT)
Dept: ULTRASOUND IMAGING | Facility: HOSPITAL | Age: 27
End: 2022-08-18
Attending: INTERNAL MEDICINE
Payer: COMMERCIAL

## 2022-08-18 LAB
ALBUMIN SERPL-MCNC: 2.9 G/DL (ref 3.4–5)
ALP LIVER SERPL-CCNC: 165 U/L
ALT SERPL-CCNC: 171 U/L
AST SERPL-CCNC: 63 U/L (ref 15–37)
BILIRUB DIRECT SERPL-MCNC: 4.8 MG/DL (ref 0–0.2)
BILIRUB SERPL-MCNC: 7 MG/DL (ref 0.1–2)
C TRACH DNA SPEC QL NAA+PROBE: NEGATIVE
CHOLEST SERPL-MCNC: 103 MG/DL (ref ?–200)
HDLC SERPL-MCNC: 19 MG/DL (ref 40–59)
INR BLD: 0.99 (ref 0.85–1.16)
LDLC SERPL CALC-MCNC: 59 MG/DL (ref ?–100)
MAGNESIUM SERPL-MCNC: 2 MG/DL (ref 1.6–2.6)
N GONORRHOEA DNA SPEC QL NAA+PROBE: NEGATIVE
NONHDLC SERPL-MCNC: 84 MG/DL (ref ?–130)
PROT SERPL-MCNC: 6.1 G/DL (ref 6.4–8.2)
PROTHROMBIN TIME: 13.1 SECONDS (ref 11.6–14.8)
TRIGL SERPL-MCNC: 141 MG/DL (ref 30–149)
VLDLC SERPL CALC-MCNC: 21 MG/DL (ref 0–30)

## 2022-08-18 PROCEDURE — 76942 ECHO GUIDE FOR BIOPSY: CPT | Performed by: INTERNAL MEDICINE

## 2022-08-18 PROCEDURE — 0FB13ZX EXCISION OF RIGHT LOBE LIVER, PERCUTANEOUS APPROACH, DIAGNOSTIC: ICD-10-PCS | Performed by: RADIOLOGY

## 2022-08-18 PROCEDURE — 99152 MOD SED SAME PHYS/QHP 5/>YRS: CPT | Performed by: INTERNAL MEDICINE

## 2022-08-18 PROCEDURE — 99233 SBSQ HOSP IP/OBS HIGH 50: CPT | Performed by: STUDENT IN AN ORGANIZED HEALTH CARE EDUCATION/TRAINING PROGRAM

## 2022-08-18 PROCEDURE — 47000 NEEDLE BIOPSY OF LIVER PERQ: CPT | Performed by: INTERNAL MEDICINE

## 2022-08-18 RX ORDER — FLUMAZENIL 0.1 MG/ML
INJECTION, SOLUTION INTRAVENOUS
Status: DISCONTINUED
Start: 2022-08-18 | End: 2022-08-18 | Stop reason: WASHOUT

## 2022-08-18 RX ORDER — NALOXONE HYDROCHLORIDE 0.4 MG/ML
INJECTION, SOLUTION INTRAMUSCULAR; INTRAVENOUS; SUBCUTANEOUS
Status: DISCONTINUED
Start: 2022-08-18 | End: 2022-08-18 | Stop reason: WASHOUT

## 2022-08-18 RX ORDER — SODIUM CHLORIDE 9 MG/ML
INJECTION, SOLUTION INTRAVENOUS CONTINUOUS
Status: DISCONTINUED | OUTPATIENT
Start: 2022-08-18 | End: 2022-08-20

## 2022-08-18 RX ORDER — TRAMADOL HYDROCHLORIDE 50 MG/1
50 TABLET ORAL EVERY 6 HOURS PRN
Status: DISCONTINUED | OUTPATIENT
Start: 2022-08-18 | End: 2022-08-20

## 2022-08-18 RX ORDER — FLUMAZENIL 0.1 MG/ML
0.2 INJECTION, SOLUTION INTRAVENOUS AS NEEDED
Status: DISCONTINUED | OUTPATIENT
Start: 2022-08-18 | End: 2022-08-20

## 2022-08-18 RX ORDER — MIDAZOLAM HYDROCHLORIDE 1 MG/ML
INJECTION INTRAMUSCULAR; INTRAVENOUS
Status: COMPLETED
Start: 2022-08-18 | End: 2022-08-18

## 2022-08-18 RX ORDER — MIDAZOLAM HYDROCHLORIDE 1 MG/ML
1 INJECTION INTRAMUSCULAR; INTRAVENOUS EVERY 5 MIN PRN
Status: ACTIVE | OUTPATIENT
Start: 2022-08-18 | End: 2022-08-18

## 2022-08-18 RX ORDER — NALOXONE HYDROCHLORIDE 0.4 MG/ML
80 INJECTION, SOLUTION INTRAMUSCULAR; INTRAVENOUS; SUBCUTANEOUS AS NEEDED
Status: DISCONTINUED | OUTPATIENT
Start: 2022-08-18 | End: 2022-08-20

## 2022-08-18 NOTE — IMAGING NOTE
Received pt to [facility]. Pt verified name and  as well as allergies. Pt states NPO since last night about . Pt states he does not take blood thinners. Lab results of PT/INR and PLT reviewed. Informed consent obtained by Dr. Nichelle An. Pt positioned supine in bed. CRM and pulse ox monitoring applied, alarms enabled. Sterile prep and 1% lido to area by Dr. Nichelle An. Specimens obtained. Bacitracin and tegaderm dressing applied to site. CDI except one drop of blood. Pt positioned on [place]. Pt awake and alert, conversing appropriately with this RN and in no apparent distress. Pt transported to room 304. Pt accompanied by transporter and this RN, SBAR at bedside to [facility].

## 2022-08-18 NOTE — PROCEDURES
[facility]  Procedure Note    Storm Ceron Patient Status:  Inpatient    1995 MRN [id number]   [facility] 3NW-A Attending Maicol Angeles MD   Hosp Day # 1 PCP Hillman Lundborg, DO     Procedure: US guided liver core biopsy    Pre-Procedure Diagnosis:  Elevated LFTs    Post-Procedure Diagnosis: Same    Anesthesia:  Local and Sedation    Findings:  2 x 18g core biopsy of R hepatic lobe    Specimens: As above    Blood Loss:  Minimal    Tourniquet Time: None  Complications:  None  Drains:  None    Secondary Diagnosis:  None    Lissy Tuttle MD  2022

## 2022-08-18 NOTE — IMAGING NOTE
Reviewed case with Dr Claire Mckinnon. Plan for 4pm procedure in [address]. Pt is NPO, able to consent and PT/INR will be drawn. Pt on no blood thinners. Bedside nurse Tiana Ramirez aware of plan.

## 2022-08-19 LAB
ALBUMIN SERPL-MCNC: 3 G/DL (ref 3.4–5)
ALBUMIN/GLOB SERPL: 1 {RATIO} (ref 1–2)
ALP LIVER SERPL-CCNC: 164 U/L
ALT SERPL-CCNC: 185 U/L
ANION GAP SERPL CALC-SCNC: 1 MMOL/L (ref 0–18)
AST SERPL-CCNC: 78 U/L (ref 15–37)
BILIRUB SERPL-MCNC: 6.5 MG/DL (ref 0.1–2)
BUN BLD-MCNC: 6 MG/DL (ref 7–18)
CALCIUM BLD-MCNC: 8.8 MG/DL (ref 8.5–10.1)
CHLORIDE SERPL-SCNC: 108 MMOL/L (ref 98–112)
CO2 SERPL-SCNC: 28 MMOL/L (ref 21–32)
CREAT BLD-MCNC: 0.93 MG/DL
EBV NA IGG SER QL IA: POSITIVE
EBV VCA IGG SER QL IA: POSITIVE
GFR SERPLBLD BASED ON 1.73 SQ M-ARVRAT: 116 ML/MIN/1.73M2 (ref 60–?)
GLOBULIN PLAS-MCNC: 3 G/DL (ref 2.8–4.4)
GLUCOSE BLD-MCNC: 88 MG/DL (ref 70–99)
OSMOLALITY SERPL CALC.SUM OF ELEC: 281 MOSM/KG (ref 275–295)
POTASSIUM SERPL-SCNC: 4.1 MMOL/L (ref 3.5–5.1)
PROT SERPL-MCNC: 6 G/DL (ref 6.4–8.2)
SODIUM SERPL-SCNC: 137 MMOL/L (ref 136–145)

## 2022-08-19 PROCEDURE — 99232 SBSQ HOSP IP/OBS MODERATE 35: CPT | Performed by: STUDENT IN AN ORGANIZED HEALTH CARE EDUCATION/TRAINING PROGRAM

## 2022-08-19 NOTE — PLAN OF CARE
Patient alert and oriented. Complaints of mild pain. Ambulatory,. IVF unfusing. ABD incision intact.  All questions answered and call light in reach  Problem: SKIN/TISSUE INTEGRITY - ADULT  Goal: Incision(s), wounds(s) or drain site(s) healing without S/S of infection  Description: INTERVENTIONS:  - Assess and document risk factors for pressure ulcer development  - Assess and document skin integrity  - Assess and document dressing/incision, wound bed, drain sites and surrounding tissue  - Implement wound care per orders  - Initiate isolation precautions as appropriate  - Initiate Pressure Ulcer prevention bundle as indicated  Outcome: Progressing     Problem: PAIN - ADULT  Goal: Verbalizes/displays adequate comfort level or patient's stated pain goal  Description: INTERVENTIONS:  - Encourage pt to monitor pain and request assistance  - Assess pain using appropriate pain scale  - Administer analgesics based on type and severity of pain and evaluate response  - Implement non-pharmacological measures as appropriate and evaluate response  - Consider cultural and social influences on pain and pain management  - Manage/alleviate anxiety  - Utilize distraction and/or relaxation techniques  - Monitor for opioid side effects  - Notify MD/LIP if interventions unsuccessful or patient reports new pain  - Anticipate increased pain with activity and pre-medicate as appropriate  Outcome: Progressing

## 2022-08-19 NOTE — PLAN OF CARE
Problem: SKIN/TISSUE INTEGRITY - ADULT  Goal: Incision(s), wounds(s) or drain site(s) healing without S/S of infection  Description: INTERVENTIONS:  - Assess and document risk factors for pressure ulcer development  - Assess and document skin integrity  - Assess and document dressing/incision, wound bed, drain sites and surrounding tissue  - Implement wound care per orders  - Initiate isolation precautions as appropriate  - Initiate Pressure Ulcer prevention bundle as indicated  Outcome: Progressing     Problem: PAIN - ADULT  Goal: Verbalizes/displays adequate comfort level or patient's stated pain goal  Description: INTERVENTIONS:  - Encourage pt to monitor pain and request assistance  - Assess pain using appropriate pain scale  - Administer analgesics based on type and severity of pain and evaluate response  - Implement non-pharmacological measures as appropriate and evaluate response  - Consider cultural and social influences on pain and pain management  - Manage/alleviate anxiety  - Utilize distraction and/or relaxation techniques  - Monitor for opioid side effects  - Notify MD/LIP if interventions unsuccessful or patient reports new pain  - Anticipate increased pain with activity and pre-medicate as appropriate  Outcome: Progressing

## 2022-08-19 NOTE — PLAN OF CARE
Patient is alert and oriented x3. Up ad samy   Voiding freely. Denies nausea or vomiting. ABD incisionx1 are intact. Old drainage noted   Pain controlled.

## 2022-08-20 VITALS
SYSTOLIC BLOOD PRESSURE: 103 MMHG | HEART RATE: 46 BPM | WEIGHT: 140 LBS | BODY MASS INDEX: 19 KG/M2 | OXYGEN SATURATION: 100 % | DIASTOLIC BLOOD PRESSURE: 56 MMHG | TEMPERATURE: 98 F | RESPIRATION RATE: 16 BRPM

## 2022-08-20 LAB
ALBUMIN SERPL-MCNC: 2.8 G/DL (ref 3.4–5)
ALP LIVER SERPL-CCNC: 155 U/L
ALT SERPL-CCNC: 194 U/L
AST SERPL-CCNC: 79 U/L (ref 15–37)
BILIRUB DIRECT SERPL-MCNC: 3.8 MG/DL (ref 0–0.2)
BILIRUB SERPL-MCNC: 5.6 MG/DL (ref 0.1–2)
CMV ANTIBODY IGG: <0.2 U/ML
CMV ANTIBODY IGM: <8 AU/ML
F-ACTIN (SMOOTH MUSCLE) AB: 9 UNITS
HSV TYPE 1/2 COMBINED ABS, IGM: 0.56 IV
MITOCHONDRIAL M2 AB, IGG: 5.1 UNITS
PROT SERPL-MCNC: 6.1 G/DL (ref 6.4–8.2)

## 2022-08-20 PROCEDURE — 99239 HOSP IP/OBS DSCHRG MGMT >30: CPT | Performed by: STUDENT IN AN ORGANIZED HEALTH CARE EDUCATION/TRAINING PROGRAM

## 2022-08-20 NOTE — PLAN OF CARE
Pt a&ox4. Voiding freely. Puncture site to abdomen from liver biopsy c/d/i. Pt denies pain. IVF infusing. Denies nausea. Tolerating diet. Plan for discharge tomorrow pending morning lab work. Plan of care discussed with pt, pt verbalizes understanding.         Problem: Patient/Family Goals  Goal: Patient/Family Long Term Goal  Description: Patient's Long Term Goal: discharge home    Interventions:  - manage pain, monitor lab work  - See additional Care Plan goals for specific interventions  Outcome: Progressing  Goal: Patient/Family Short Term Goal  Description: Patient's Short Term Goal: manage pain    Interventions:   - monitor pain, administer pain meds as needed  - See additional Care Plan goals for specific interventions  Outcome: Progressing     Problem: SKIN/TISSUE INTEGRITY - ADULT  Goal: Incision(s), wounds(s) or drain site(s) healing without S/S of infection  Description: INTERVENTIONS:  - Assess and document risk factors for pressure ulcer development  - Assess and document skin integrity  - Assess and document dressing/incision, wound bed, drain sites and surrounding tissue  - Implement wound care per orders  - Initiate isolation precautions as appropriate  - Initiate Pressure Ulcer prevention bundle as indicated  Outcome: Progressing     Problem: PAIN - ADULT  Goal: Verbalizes/displays adequate comfort level or patient's stated pain goal  Description: INTERVENTIONS:  - Encourage pt to monitor pain and request assistance  - Assess pain using appropriate pain scale  - Administer analgesics based on type and severity of pain and evaluate response  - Implement non-pharmacological measures as appropriate and evaluate response  - Consider cultural and social influences on pain and pain management  - Manage/alleviate anxiety  - Utilize distraction and/or relaxation techniques  - Monitor for opioid side effects  - Notify MD/LIP if interventions unsuccessful or patient reports new pain  - Anticipate increased pain with activity and pre-medicate as appropriate  Outcome: Progressing

## 2022-08-20 NOTE — PLAN OF CARE
NURSING DISCHARGE NOTE    Discharge instructions given. All questions answered to pt and family satisfaction. IV removed and intact. Discharged Home via Ambulatory. Accompanied by Family member and Spouse  Belongings Taken by patient/family.

## 2022-08-22 NOTE — PROGRESS NOTES
Liver biopsy results noted to be consistent with DILI. Called with no answer; [email or web address] message sent. Will need repeat LFT this week and OV in 2-3 weeks.

## 2022-08-23 ENCOUNTER — PATIENT OUTREACH (OUTPATIENT)
Dept: CASE MANAGEMENT | Age: 27
End: 2022-08-23

## 2022-08-23 NOTE — PROGRESS NOTES
VM received; pt requesting assistance w/scheduling apt (dc 08/20)    Dr Hurd [address] [address] [phone]  Follow up 1-2 weeks    Dr Moises Loyola,  Consult to Primary Care  [address]    Dr Josue Calero  [address] [phone]  Follow up 3 weeks

## 2022-08-31 ENCOUNTER — MED REC SCAN ONLY (OUTPATIENT)
Dept: FAMILY MEDICINE CLINIC | Facility: CLINIC | Age: 27
End: 2022-08-31

## 2022-09-08 PROBLEM — K21.00 GASTROESOPHAGEAL REFLUX DISEASE WITH ESOPHAGITIS WITHOUT HEMORRHAGE: Status: ACTIVE | Noted: 2022-05-07

## 2022-09-08 PROBLEM — R07.89 OTHER CHEST PAIN: Status: ACTIVE | Noted: 2022-05-07

## 2022-09-08 PROBLEM — R07.82 INTERCOSTAL PAIN: Status: ACTIVE | Noted: 2022-05-07

## 2022-09-08 PROBLEM — S23.41XA SPRAIN OF RIBS: Status: ACTIVE | Noted: 2022-05-07

## 2022-09-19 ENCOUNTER — MED REC SCAN ONLY (OUTPATIENT)
Dept: FAMILY MEDICINE CLINIC | Facility: CLINIC | Age: 27
End: 2022-09-19

## 2024-09-11 ENCOUNTER — TELEPHONE (OUTPATIENT)
Dept: FAMILY MEDICINE CLINIC | Facility: CLINIC | Age: 29
End: 2024-09-11

## 2024-09-11 NOTE — TELEPHONE ENCOUNTER
Medical Records Request received from [employer] for records for the past three years.     Release original sent to [employer] on 09/11/2024. Copy sent to scanning.

## (undated) DIAGNOSIS — T14.8XXA MUSCULOSKELETAL STRAIN: Primary | ICD-10-CM

## (undated) DIAGNOSIS — R25.1 SHAKING: ICD-10-CM

## (undated) NOTE — LETTER
:  1995      To Whom It May Concern: This patient was seen in our hospital on 2022- 22 . Work status: May return to work full-time, no restrictions    May return to work status per above effective 2022. If this office may be of further assistance, please do not hesitate to contact us.       Sincerely,        Keila Melton MD

## (undated) NOTE — MR AVS SNAPSHOT
[facility], [address] [phone]               Thank you for choosing us for your health care visit with Donovan Scott DO.   We are glad to serve you and happy to provide you with this summary Current Medications          This list is accurate as of: 3/21/17  5:07 PM.  Always use your most recent med list.                Cholecalciferol 2000 units Caps   Take one capsule by mouth daily starting after 8 weeks of prescription strength Vitamin D Expires: 5/20/2017  5:07 PM    If you have questions, you can call [phone] to talk to our [facility] Staff. Remember, [employer] is NOT to be used for urgent needs. For medical emergencies, dial 911.            Visit [employer]